# Patient Record
Sex: FEMALE | Race: WHITE | NOT HISPANIC OR LATINO | URBAN - METROPOLITAN AREA
[De-identification: names, ages, dates, MRNs, and addresses within clinical notes are randomized per-mention and may not be internally consistent; named-entity substitution may affect disease eponyms.]

---

## 2017-04-14 ENCOUNTER — OUTPATIENT (OUTPATIENT)
Dept: OUTPATIENT SERVICES | Facility: HOSPITAL | Age: 67
LOS: 1 days | Discharge: HOME | End: 2017-04-14
Payer: MEDICARE

## 2017-04-14 VITALS
TEMPERATURE: 98 F | SYSTOLIC BLOOD PRESSURE: 138 MMHG | DIASTOLIC BLOOD PRESSURE: 76 MMHG | OXYGEN SATURATION: 99 % | WEIGHT: 160.06 LBS | RESPIRATION RATE: 14 BRPM | HEIGHT: 64 IN | HEART RATE: 69 BPM

## 2017-04-14 DIAGNOSIS — M22.42 CHONDROMALACIA PATELLAE, LEFT KNEE: ICD-10-CM

## 2017-04-14 DIAGNOSIS — M23.342 OTHER MENISCUS DERANGEMENTS, ANTERIOR HORN OF LATERAL MENISCUS, LEFT KNEE: ICD-10-CM

## 2017-04-14 DIAGNOSIS — G35 MULTIPLE SCLEROSIS: ICD-10-CM

## 2017-04-14 DIAGNOSIS — Z98.890 OTHER SPECIFIED POSTPROCEDURAL STATES: Chronic | ICD-10-CM

## 2017-04-14 DIAGNOSIS — S83.232D COMPLEX TEAR OF MEDIAL MENISCUS, CURRENT INJURY, LEFT KNEE, SUBSEQUENT ENCOUNTER: ICD-10-CM

## 2017-04-14 DIAGNOSIS — M23.321 OTHER MENISCUS DERANGEMENTS, POSTERIOR HORN OF MEDIAL MENISCUS, RIGHT KNEE: ICD-10-CM

## 2017-04-14 DIAGNOSIS — I10 ESSENTIAL (PRIMARY) HYPERTENSION: ICD-10-CM

## 2017-04-14 DIAGNOSIS — Z01.818 ENCOUNTER FOR OTHER PREPROCEDURAL EXAMINATION: ICD-10-CM

## 2017-04-14 DIAGNOSIS — Z90.49 ACQUIRED ABSENCE OF OTHER SPECIFIED PARTS OF DIGESTIVE TRACT: Chronic | ICD-10-CM

## 2017-04-14 DIAGNOSIS — E03.9 HYPOTHYROIDISM, UNSPECIFIED: ICD-10-CM

## 2017-04-14 DIAGNOSIS — Z90.89 ACQUIRED ABSENCE OF OTHER ORGANS: Chronic | ICD-10-CM

## 2017-04-14 LAB
ANION GAP SERPL CALC-SCNC: 8 MMOL/L — SIGNIFICANT CHANGE UP (ref 5–17)
BASOPHILS # BLD AUTO: 0.1 K/UL — SIGNIFICANT CHANGE UP (ref 0–0.2)
BASOPHILS NFR BLD AUTO: 1.7 % — SIGNIFICANT CHANGE UP (ref 0–2)
BUN SERPL-MCNC: 23 MG/DL — SIGNIFICANT CHANGE UP (ref 7–23)
CALCIUM SERPL-MCNC: 9.4 MG/DL — SIGNIFICANT CHANGE UP (ref 8.5–10.1)
CHLORIDE SERPL-SCNC: 104 MMOL/L — SIGNIFICANT CHANGE UP (ref 96–108)
CO2 SERPL-SCNC: 28 MMOL/L — SIGNIFICANT CHANGE UP (ref 22–31)
CREAT SERPL-MCNC: 0.63 MG/DL — SIGNIFICANT CHANGE UP (ref 0.5–1.3)
EOSINOPHIL # BLD AUTO: 0.5 K/UL — SIGNIFICANT CHANGE UP (ref 0–0.5)
EOSINOPHIL NFR BLD AUTO: 7.2 % — HIGH (ref 0–6)
GLUCOSE SERPL-MCNC: 88 MG/DL — SIGNIFICANT CHANGE UP (ref 70–99)
HCT VFR BLD CALC: 44.6 % — SIGNIFICANT CHANGE UP (ref 34.5–45)
HGB BLD-MCNC: 15.3 G/DL — SIGNIFICANT CHANGE UP (ref 11.5–15.5)
LYMPHOCYTES # BLD AUTO: 1.9 K/UL — SIGNIFICANT CHANGE UP (ref 1–3.3)
LYMPHOCYTES # BLD AUTO: 25.2 % — SIGNIFICANT CHANGE UP (ref 13–44)
MCHC RBC-ENTMCNC: 31.5 PG — SIGNIFICANT CHANGE UP (ref 27–34)
MCHC RBC-ENTMCNC: 34.4 GM/DL — SIGNIFICANT CHANGE UP (ref 32–36)
MCV RBC AUTO: 91.5 FL — SIGNIFICANT CHANGE UP (ref 80–100)
MONOCYTES # BLD AUTO: 0.6 K/UL — SIGNIFICANT CHANGE UP (ref 0–0.9)
MONOCYTES NFR BLD AUTO: 7.7 % — SIGNIFICANT CHANGE UP (ref 2–14)
NEUTROPHILS # BLD AUTO: 4.3 K/UL — SIGNIFICANT CHANGE UP (ref 1.8–7.4)
NEUTROPHILS NFR BLD AUTO: 58.3 % — SIGNIFICANT CHANGE UP (ref 43–77)
PLATELET # BLD AUTO: 226 K/UL — SIGNIFICANT CHANGE UP (ref 150–400)
POTASSIUM SERPL-MCNC: 4.2 MMOL/L — SIGNIFICANT CHANGE UP (ref 3.5–5.3)
POTASSIUM SERPL-SCNC: 4.2 MMOL/L — SIGNIFICANT CHANGE UP (ref 3.5–5.3)
RBC # BLD: 4.87 M/UL — SIGNIFICANT CHANGE UP (ref 3.8–5.2)
RBC # FLD: 12.9 % — SIGNIFICANT CHANGE UP (ref 10.3–14.5)
SODIUM SERPL-SCNC: 140 MMOL/L — SIGNIFICANT CHANGE UP (ref 135–145)
WBC # BLD: 7.4 K/UL — SIGNIFICANT CHANGE UP (ref 3.8–10.5)
WBC # FLD AUTO: 7.4 K/UL — SIGNIFICANT CHANGE UP (ref 3.8–10.5)

## 2017-04-14 PROCEDURE — 93010 ELECTROCARDIOGRAM REPORT: CPT

## 2017-04-14 NOTE — H&P PST ADULT - HISTORY OF PRESENT ILLNESS
65 yo female presents to PST prior to proposed procedure. c/o left knee pain x 6 months after "kicking something under the bed". Reports pain 5/10 that increases at night & with bending the knee. Consulted with Dr Gamboa for xrays & MRI of left knee. Had cortisone injection x 2 with slight relief but then injured knee walking on the treadmill. Pain managed with aleve prn.   Now for said procedure.

## 2017-04-14 NOTE — H&P PST ADULT - FAMILY HISTORY
Father  Still living? No  Family history of myocardial infarction, Age at diagnosis: Age Unknown  Family history of diabetes mellitus, Age at diagnosis: Age Unknown  Family history of TIAs, Age at diagnosis: Age Unknown     Mother  Still living? Yes, Estimated age:   Family history of diabetes mellitus, Age at diagnosis: Age Unknown

## 2017-04-14 NOTE — H&P PST ADULT - PSH
History of esophagogastroduodenoscopy (EGD)    S/P cholecystectomy  & appendectomy 1973  S/P colonoscopy    S/P D&C (status post dilation and curettage)    S/P tonsillectomy

## 2017-04-14 NOTE — H&P PST ADULT - PMH
Depressive disorder    Diverticular disease    Essential hypertension    Gastroesophageal reflux disease without esophagitis    History of kidney stones    History of pneumonia    Hypothyroid    MS (multiple sclerosis)    Pure hypercholesterolemia

## 2017-04-14 NOTE — H&P PST ADULT - ASSESSMENT
67 yo female scheduled for left knee arthroscopy with Dr Gamboa on 4/28/17.    1. Labs as per surgeon  2. EKG  3. Medical clearance with PCP Dr Yeung  4. discussed EZ sponges & day of procedure instructions

## 2017-04-14 NOTE — H&P PST ADULT - PSYCHIATRIC COMMENTS
Reports mild situational depression & is going to discuss medication with PCP @ clearance appointment.

## 2017-04-14 NOTE — H&P PST ADULT - MUSCULOSKELETAL COMMENTS
Left anterior knee tender to palpation. Decreased flexion/extension. Left hamstring & quad strength 4/5. Reports left knee pain & decreased activity level.

## 2017-04-24 RX ORDER — FAMOTIDINE 10 MG/ML
20 INJECTION INTRAVENOUS ONCE
Qty: 0 | Refills: 0 | Status: COMPLETED | OUTPATIENT
Start: 2017-04-25 | End: 2017-04-25

## 2017-04-24 RX ORDER — OXYCODONE HYDROCHLORIDE 5 MG/1
10 TABLET ORAL ONCE
Qty: 0 | Refills: 0 | Status: DISCONTINUED | OUTPATIENT
Start: 2017-04-25 | End: 2017-04-25

## 2017-04-24 RX ORDER — ACETAMINOPHEN 500 MG
975 TABLET ORAL ONCE
Qty: 0 | Refills: 0 | Status: COMPLETED | OUTPATIENT
Start: 2017-04-25 | End: 2017-04-25

## 2017-04-24 RX ORDER — SODIUM CHLORIDE 9 MG/ML
1000 INJECTION, SOLUTION INTRAVENOUS
Qty: 0 | Refills: 0 | Status: DISCONTINUED | OUTPATIENT
Start: 2017-04-25 | End: 2017-04-25

## 2017-04-24 RX ORDER — CELECOXIB 200 MG/1
200 CAPSULE ORAL ONCE
Qty: 0 | Refills: 0 | Status: COMPLETED | OUTPATIENT
Start: 2017-04-25 | End: 2017-04-25

## 2017-04-25 ENCOUNTER — OUTPATIENT (OUTPATIENT)
Dept: OUTPATIENT SERVICES | Facility: HOSPITAL | Age: 67
LOS: 1 days | Discharge: HOME | End: 2017-04-25
Payer: MEDICARE

## 2017-04-25 VITALS
DIASTOLIC BLOOD PRESSURE: 99 MMHG | TEMPERATURE: 98 F | HEIGHT: 64 IN | SYSTOLIC BLOOD PRESSURE: 166 MMHG | OXYGEN SATURATION: 100 % | RESPIRATION RATE: 16 BRPM | HEART RATE: 75 BPM | WEIGHT: 160.06 LBS

## 2017-04-25 VITALS
OXYGEN SATURATION: 97 % | RESPIRATION RATE: 16 BRPM | HEART RATE: 62 BPM | DIASTOLIC BLOOD PRESSURE: 64 MMHG | SYSTOLIC BLOOD PRESSURE: 124 MMHG

## 2017-04-25 DIAGNOSIS — Z98.890 OTHER SPECIFIED POSTPROCEDURAL STATES: Chronic | ICD-10-CM

## 2017-04-25 DIAGNOSIS — Z90.49 ACQUIRED ABSENCE OF OTHER SPECIFIED PARTS OF DIGESTIVE TRACT: Chronic | ICD-10-CM

## 2017-04-25 DIAGNOSIS — Z90.89 ACQUIRED ABSENCE OF OTHER ORGANS: Chronic | ICD-10-CM

## 2017-04-25 PROCEDURE — 88304 TISSUE EXAM BY PATHOLOGIST: CPT | Mod: 26

## 2017-04-25 RX ORDER — ASPIRIN/CALCIUM CARB/MAGNESIUM 324 MG
1 TABLET ORAL
Qty: 14 | Refills: 0
Start: 2017-04-25 | End: 2017-05-09

## 2017-04-25 RX ORDER — FENTANYL CITRATE 50 UG/ML
50 INJECTION INTRAVENOUS
Qty: 0 | Refills: 0 | Status: DISCONTINUED | OUTPATIENT
Start: 2017-04-25 | End: 2017-04-25

## 2017-04-25 RX ORDER — OXYCODONE HYDROCHLORIDE 5 MG/1
5 TABLET ORAL EVERY 4 HOURS
Qty: 0 | Refills: 0 | Status: DISCONTINUED | OUTPATIENT
Start: 2017-04-25 | End: 2017-04-25

## 2017-04-25 RX ORDER — OXYCODONE HYDROCHLORIDE 5 MG/1
1 TABLET ORAL
Qty: 40 | Refills: 0
Start: 2017-04-25

## 2017-04-25 RX ORDER — ONDANSETRON 8 MG/1
4 TABLET, FILM COATED ORAL EVERY 6 HOURS
Qty: 0 | Refills: 0 | Status: DISCONTINUED | OUTPATIENT
Start: 2017-04-25 | End: 2017-04-25

## 2017-04-25 RX ORDER — SODIUM CHLORIDE 9 MG/ML
1000 INJECTION, SOLUTION INTRAVENOUS
Qty: 0 | Refills: 0 | Status: DISCONTINUED | OUTPATIENT
Start: 2017-04-25 | End: 2017-05-10

## 2017-04-25 RX ADMIN — SODIUM CHLORIDE 75 MILLILITER(S): 9 INJECTION, SOLUTION INTRAVENOUS at 13:37

## 2017-04-25 RX ADMIN — FAMOTIDINE 20 MILLIGRAM(S): 10 INJECTION INTRAVENOUS at 11:20

## 2017-04-25 RX ADMIN — CELECOXIB 200 MILLIGRAM(S): 200 CAPSULE ORAL at 11:20

## 2017-04-25 RX ADMIN — OXYCODONE HYDROCHLORIDE 10 MILLIGRAM(S): 5 TABLET ORAL at 11:21

## 2017-04-25 RX ADMIN — Medication 975 MILLIGRAM(S): at 11:21

## 2017-04-25 NOTE — BRIEF OPERATIVE NOTE - PRE-OP DX
Medial meniscus tear  04/25/2017    Active  Herman Howe Lateral meniscus tear  04/25/2017    Active  Herman Howe  Medial meniscus tear  04/25/2017    Active  Herman Howe

## 2017-04-25 NOTE — ASU DISCHARGE PLAN (ADULT/PEDIATRIC). - MEDICATION SUMMARY - MEDICATIONS TO TAKE
I will START or STAY ON the medications listed below when I get home from the hospital:    aspirin 81 mg oral delayed release tablet  -- 1 tab(s) by mouth once a day  -- Indication: For home med    Aleve 220 mg oral capsule  -- 1 cap(s) by mouth every 12 hours, As Needed  -- Indication: For home med    acetaminophen-oxyCODONE 325 mg-5 mg oral tablet  -- 1 tab(s) by mouth 4 times a day, As Needed -for severe pain MDD:6  -- Caution federal law prohibits the transfer of this drug to any person other  than the person for whom it was prescribed.  May cause drowsiness.  Alcohol may intensify this effect.  Use care when operating dangerous machinery.  This prescription cannot be refilled.  This product contains acetaminophen.  Do not use  with any other product containing acetaminophen to prevent possible liver damage.  Using more of this medication than prescribed may cause serious breathing problems.    -- Indication: For pain med    Aspirin Enteric Coated 325 mg oral delayed release tablet  -- 1 tab(s) by mouth once a day  -- Swallow whole.  Do not crush.  Take with food or milk.    -- Indication: For dvt ppx    lisinopril 10 mg oral tablet  -- 1 tab(s) by mouth 2 times a day  -- Indication: For home med    simvastatin 20 mg oral tablet  -- 1 tab(s) by mouth once a day (at bedtime)  -- Indication: For home med    triamterene-hydroCHLOROthiazide 37.5mg-25mg oral tablet  -- 1 tab(s) by mouth once a day  -- Indication: For home med    melatonin 5 mg oral tablet  -- 1 tab(s) by mouth once a day (at bedtime)  -- Indication: For home med    omeprazole 20 mg oral delayed release tablet  -- 1 tab(s) by mouth once a day  -- Indication: For home med    levothyroxine 50 mcg (0.05 mg) oral tablet  -- 1 tab(s) by mouth once a day  -- Indication: For home med    Vitamin D3 1000 intl units oral capsule  -- 1 cap(s) by mouth once a day  -- Indication: For home med

## 2017-04-25 NOTE — BRIEF OPERATIVE NOTE - PROCEDURE
Partial medial meniscectomy of left knee  04/25/2017    Active  DAMEON Partial lateral meniscectomy of left knee  04/25/2017    Active  DAMEON

## 2017-04-27 LAB — SURGICAL PATHOLOGY FINAL REPORT - CH: SIGNIFICANT CHANGE UP

## 2017-05-03 DIAGNOSIS — I10 ESSENTIAL (PRIMARY) HYPERTENSION: ICD-10-CM

## 2017-05-03 DIAGNOSIS — M23.321 OTHER MENISCUS DERANGEMENTS, POSTERIOR HORN OF MEDIAL MENISCUS, RIGHT KNEE: ICD-10-CM

## 2017-05-03 DIAGNOSIS — M23.342 OTHER MENISCUS DERANGEMENTS, ANTERIOR HORN OF LATERAL MENISCUS, LEFT KNEE: ICD-10-CM

## 2017-05-03 DIAGNOSIS — M22.42 CHONDROMALACIA PATELLAE, LEFT KNEE: ICD-10-CM

## 2017-05-03 DIAGNOSIS — G35 MULTIPLE SCLEROSIS: ICD-10-CM

## 2017-05-03 DIAGNOSIS — E03.9 HYPOTHYROIDISM, UNSPECIFIED: ICD-10-CM

## 2018-04-21 NOTE — H&P PST ADULT - VASCULAR
Gyn Progress note   Yue Galarza 42 y o  female MRN: [id number]  Unit/Bed#: PWQO 024-32 Encounter: [id number]    Yue Galarza has no current complaints  Pain is present - adequately treated  Patient is currently voiding  She is not ambulating  Patient is not currently passing flatus and has had no bowel movement  She is tolerating PO, and denies nausea or vomitting  Patient denies fever, chills, chest pain, shortness of breath, or calf tenderness  /72 (BP Location: Left arm)   Pulse 77   Temp 98 9 °F (37 2 °C) (Oral)   Resp 20   Ht 5' 6" (1 676 m)   Wt 85 7 kg (189 lb)   SpO2 100%   BMI 30 51 kg/m²     I/O last 3 completed shifts: In: 1800 [I V :1800]  Out: 500 [Urine:100; Blood:400]  I/O this shift:  In: 1680 [P O :480; I V :1200]  Out: 200 [Urine:200]    Lab Results   Component Value Date    WBC 14 86 (H) 04/21/2018    HGB 11 2 (L) 04/21/2018    HCT 34 5 (L) 04/21/2018    MCV 85 04/21/2018     04/21/2018       Lab Results   Component Value Date    GLUCOSE 123 04/21/2018    CALCIUM 8 0 (L) 04/21/2018     (L) 04/21/2018    K 4 0 04/21/2018    CO2 25 04/21/2018     04/21/2018    BUN 11 04/21/2018    CREATININE 0 64 04/21/2018       Magnesium   Date Value Ref Range Status   04/06/2015 1 9 1 6 - 2 6 mg/dL Final     Comment:     The above 1 analytes were performed by Martín Velázquez [id number]         Lab Results   Component Value Date/Time    POCGLU 128 04/20/2018 06:56 PM       Physical Exam  Gen: AAOx3, NAD, comfortable in bed   CVS:  RRR, no murmurs or gallops  Lungs: CTA B/L, no rales or rhonchi,  normal respiratory effort and rate  Abdomen: soft, non tender, incisions C/D/I  Extremities: SCDs on and on, non tender, no edema, negative Homans  A/P: Yue Galarza s/p total vaginal hysterectomy for chronic pelvic pain POD# 1  1)Surgery:   Follow-up final pathology, hemoglobin with adequate dropped from 12 1-11 2  2) hypothyroidism:  Continue home Synthroid  3) Encouraged incentive spirometry to reduce atelectasis and pneumonia risk  4) Pain:  Well-controlled on current regimen continue  5) FEN: will advance to Regular diet, will DC IV fluids  6) PPX:  SCDs    Dispo: anticipate DC home today Equal and normal pulses (carotid, femoral, dorsalis pedis)

## 2019-03-05 PROBLEM — K21.9 GASTRO-ESOPHAGEAL REFLUX DISEASE WITHOUT ESOPHAGITIS: Chronic | Status: ACTIVE | Noted: 2017-04-14

## 2019-03-05 PROBLEM — F32.9 MAJOR DEPRESSIVE DISORDER, SINGLE EPISODE, UNSPECIFIED: Chronic | Status: ACTIVE | Noted: 2017-04-14

## 2019-03-05 PROBLEM — Z87.01 PERSONAL HISTORY OF PNEUMONIA (RECURRENT): Chronic | Status: ACTIVE | Noted: 2017-04-14

## 2019-03-05 PROBLEM — Z87.442 PERSONAL HISTORY OF URINARY CALCULI: Chronic | Status: ACTIVE | Noted: 2017-04-14

## 2019-03-05 PROBLEM — I10 ESSENTIAL (PRIMARY) HYPERTENSION: Chronic | Status: ACTIVE | Noted: 2017-04-14

## 2019-03-05 PROBLEM — E03.9 HYPOTHYROIDISM, UNSPECIFIED: Chronic | Status: ACTIVE | Noted: 2017-04-14

## 2019-03-05 PROBLEM — K57.90 DIVERTICULOSIS OF INTESTINE, PART UNSPECIFIED, WITHOUT PERFORATION OR ABSCESS WITHOUT BLEEDING: Chronic | Status: ACTIVE | Noted: 2017-04-14

## 2019-03-05 PROBLEM — G35 MULTIPLE SCLEROSIS: Chronic | Status: ACTIVE | Noted: 2017-04-14

## 2019-03-05 PROBLEM — E78.00 PURE HYPERCHOLESTEROLEMIA, UNSPECIFIED: Chronic | Status: ACTIVE | Noted: 2017-04-14

## 2019-03-07 ENCOUNTER — APPOINTMENT (OUTPATIENT)
Dept: FAMILY MEDICINE | Facility: CLINIC | Age: 69
End: 2019-03-07
Payer: MEDICARE

## 2019-03-07 VITALS
HEIGHT: 66 IN | HEART RATE: 79 BPM | BODY MASS INDEX: 25.88 KG/M2 | WEIGHT: 161 LBS | OXYGEN SATURATION: 98 % | TEMPERATURE: 98.9 F | SYSTOLIC BLOOD PRESSURE: 140 MMHG | DIASTOLIC BLOOD PRESSURE: 90 MMHG | RESPIRATION RATE: 15 BRPM

## 2019-03-07 DIAGNOSIS — J45.909 UNSPECIFIED ASTHMA, UNCOMPLICATED: ICD-10-CM

## 2019-03-07 DIAGNOSIS — Z83.3 FAMILY HISTORY OF DIABETES MELLITUS: ICD-10-CM

## 2019-03-07 DIAGNOSIS — Z81.8 FAMILY HISTORY OF OTHER MENTAL AND BEHAVIORAL DISORDERS: ICD-10-CM

## 2019-03-07 DIAGNOSIS — Z82.49 FAMILY HISTORY OF ISCHEMIC HEART DISEASE AND OTHER DISEASES OF THE CIRCULATORY SYSTEM: ICD-10-CM

## 2019-03-07 DIAGNOSIS — Z86.69 PERSONAL HISTORY OF OTHER DISEASES OF THE NERVOUS SYSTEM AND SENSE ORGANS: ICD-10-CM

## 2019-03-07 DIAGNOSIS — Z82.1 FAMILY HISTORY OF BLINDNESS AND VISUAL LOSS: ICD-10-CM

## 2019-03-07 DIAGNOSIS — Z87.01 PERSONAL HISTORY OF PNEUMONIA (RECURRENT): ICD-10-CM

## 2019-03-07 LAB — CYTOLOGY CVX/VAG DOC THIN PREP: NORMAL

## 2019-03-07 PROCEDURE — 90472 IMMUNIZATION ADMIN EACH ADD: CPT | Mod: GY

## 2019-03-07 PROCEDURE — 99204 OFFICE O/P NEW MOD 45 MIN: CPT | Mod: 25

## 2019-03-07 PROCEDURE — 90715 TDAP VACCINE 7 YRS/> IM: CPT | Mod: GY

## 2019-03-07 PROCEDURE — 90746 HEPB VACCINE 3 DOSE ADULT IM: CPT

## 2019-03-07 PROCEDURE — G0444 DEPRESSION SCREEN ANNUAL: CPT | Mod: 59

## 2019-03-07 PROCEDURE — G0010: CPT

## 2019-03-07 RX ORDER — CHROMIUM 200 MCG
TABLET ORAL
Refills: 0 | Status: ACTIVE | COMMUNITY

## 2019-03-07 RX ORDER — VIT C/E/CUPERIC/ZINC/LUTEIN 226-90-0.8
CAPSULE ORAL
Refills: 0 | Status: ACTIVE | COMMUNITY

## 2019-03-07 NOTE — HEALTH RISK ASSESSMENT
[Good] : ~his/her~  mood as  good [1] : 2) Feeling down, depressed, or hopeless for several days (1) [None] : None [Alone] : lives alone [Single] : single [Significant Other] : lives with significant other [Sexually Active] : sexually active [Feels Safe at Home] : Feels safe at home [Fully functional (bathing, dressing, toileting, transferring, walking, feeding)] : Fully functional (bathing, dressing, toileting, transferring, walking, feeding) [Fully functional (using the telephone, shopping, preparing meals, housekeeping, doing laundry, using] : Fully functional and needs no help or supervision to perform IADLs (using the telephone, shopping, preparing meals, housekeeping, doing laundry, using transportation, managing medications and managing finances) [Smoke Detector] : smoke detector [Carbon Monoxide Detector] : carbon monoxide detector [Seat Belt] :  uses seat belt [Sunscreen] : uses sunscreen [With Patient/Caregiver] : With Patient/Caregiver [Name: ___] : Health Care Proxy's Name: [unfilled]  [Relationship: ___] : Relationship: [unfilled] [No falls in past year] : Patient reported no falls in the past year [Patient reported mammogram was normal] : Patient reported mammogram was normal [Patient reported PAP Smear was normal] : Patient reported PAP Smear was normal [Patient reported bone density results were normal] : Patient reported bone density results were normal [Patient reported colonoscopy was normal] : Patient reported colonoscopy was normal [] : No [[id number]] : 2 [Change in mental status noted] : No change in mental status noted [Language] : denies difficulty with language [Behavior] : denies difficulty with behavior [Reasoning] : denies difficulty with reasoning [High Risk Behavior] : no high risk behavior [Reports changes in hearing] : Reports no changes in hearing [Reports changes in vision] : Reports no changes in vision [Reports changes in dental health] : Reports no changes in dental health [MammogramDate] : 12/18 [PapSmearDate] : 12/18 [BoneDensityDate] : 12/18 [ColonoscopyDate] : 1/16 [FreeTextEntry2] : planning to volunteer  [AdvancecareDate] : 03/19

## 2019-03-07 NOTE — PHYSICAL EXAM
[No Acute Distress] : no acute distress [Well Nourished] : well nourished [Well Developed] : well developed [Well-Appearing] : well-appearing [Normal Sclera/Conjunctiva] : normal sclera/conjunctiva [PERRL] : pupils equal round and reactive to light [EOMI] : extraocular movements intact [Normal Outer Ear/Nose] : the outer ears and nose were normal in appearance [Normal Oropharynx] : the oropharynx was normal [Normal TMs] : both tympanic membranes were normal [Supple] : supple [No Lymphadenopathy] : no lymphadenopathy [No Respiratory Distress] : no respiratory distress  [Clear to Auscultation] : lungs were clear to auscultation bilaterally [No Accessory Muscle Use] : no accessory muscle use [Normal Rate] : normal rate  [Regular Rhythm] : with a regular rhythm [Normal S1, S2] : normal S1 and S2 [Pedal Pulses Present] : the pedal pulses are present [No Edema] : there was no peripheral edema [Soft] : abdomen soft [Non Tender] : non-tender [Non-distended] : non-distended [No Masses] : no abdominal mass palpated [No HSM] : no HSM [Normal Bowel Sounds] : normal bowel sounds [Normal Posterior Cervical Nodes] : no posterior cervical lymphadenopathy [Normal Anterior Cervical Nodes] : no anterior cervical lymphadenopathy [No Spinal Tenderness] : no spinal tenderness [Grossly Normal Strength/Tone] : grossly normal strength/tone [No Rash] : no rash [Normal Gait] : normal gait [No Focal Deficits] : no focal deficits [Normal Affect] : the affect was normal [Alert and Oriented x3] : oriented to person, place, and time [Normal Insight/Judgement] : insight and judgment were intact

## 2019-03-07 NOTE — ASSESSMENT
[FreeTextEntry1] : Establish care\par - going for blood work in 2 weeks \par - will get records from cardio\par - up to date with flu vaccine\par - tdap given today\par - hep b series started today - will follow up in 2 months for 2nd shot\par - believes she had PNA vaccine - will bring record to next visit\par - up to date with pap, mammo, DEXA, and colonoscopy \par \par Hypertension\par - BP well controlled\par - following with cardiology, next appt in 2 weeks\par - continue lisinopril and dyazide\par \par Hyperlipidemia\par - on statin\par - will have repeat lipid panel at next blood draw\par \par Hypothyroid\par - on levothyroxine\par - going for blood work in 2 weeks

## 2019-03-07 NOTE — COUNSELING
[Healthy eating counseling provided] : healthy eating [Activity counseling provided] : activity [Good understanding] : Patient has a good understanding of disease, goals and obesity follow-up plan [ - Annual Depression Screening] : Annual Depression Screening

## 2019-03-07 NOTE — HISTORY OF PRESENT ILLNESS
[FreeTextEntry1] : establish care\par needs vaccines [de-identified] : Patient is here today to establish care.  She has not had a primary care doctor in years.  She has been routinely getting check ups at her cardiologist office.  She has follow up with the cardiologist in 2 weeks.  She is doing well, without any major complaints. 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  She has been getting paperwork filled out.  She had titer blood tests which showed immunity to all except HepB.  She would like to start the vaccine series.

## 2019-03-18 ENCOUNTER — APPOINTMENT (OUTPATIENT)
Dept: FAMILY MEDICINE | Facility: CLINIC | Age: 69
End: 2019-03-18
Payer: MEDICARE

## 2019-03-18 VITALS
HEIGHT: 66 IN | HEART RATE: 69 BPM | RESPIRATION RATE: 16 BRPM | DIASTOLIC BLOOD PRESSURE: 64 MMHG | BODY MASS INDEX: 25.88 KG/M2 | OXYGEN SATURATION: 98 % | TEMPERATURE: 97.9 F | WEIGHT: 161 LBS | SYSTOLIC BLOOD PRESSURE: 122 MMHG

## 2019-03-18 DIAGNOSIS — Z11.1 ENCOUNTER FOR SCREENING FOR RESPIRATORY TUBERCULOSIS: ICD-10-CM

## 2019-03-18 PROCEDURE — 86580 TB INTRADERMAL TEST: CPT

## 2019-03-18 PROCEDURE — 99211 OFF/OP EST MAY X REQ PHY/QHP: CPT | Mod: 25

## 2019-04-29 ENCOUNTER — APPOINTMENT (OUTPATIENT)
Dept: FAMILY MEDICINE | Facility: CLINIC | Age: 69
End: 2019-04-29
Payer: MEDICARE

## 2019-04-29 VITALS
WEIGHT: 166 LBS | HEIGHT: 66 IN | TEMPERATURE: 97.9 F | SYSTOLIC BLOOD PRESSURE: 140 MMHG | DIASTOLIC BLOOD PRESSURE: 76 MMHG | OXYGEN SATURATION: 97 % | BODY MASS INDEX: 26.68 KG/M2 | HEART RATE: 75 BPM | RESPIRATION RATE: 16 BRPM

## 2019-04-29 PROCEDURE — 99213 OFFICE O/P EST LOW 20 MIN: CPT | Mod: 25

## 2019-04-29 PROCEDURE — G0010: CPT

## 2019-04-29 PROCEDURE — 90746 HEPB VACCINE 3 DOSE ADULT IM: CPT

## 2019-04-29 NOTE — HISTORY OF PRESENT ILLNESS
[FreeTextEntry1] : follow up [de-identified] : Patient is here today for follow up.\par She is treated for HTN, HLD, and hypothyroid.\par She has been doing well on her medications.  She had blood work done at her cardiologist and reports it was normal. \par She needs her second Hep B injection today.

## 2019-04-29 NOTE — ASSESSMENT
[FreeTextEntry1] : Hypothyroid\par - doing well on levothyroxine\par - will get copy of labs from cardio\par \par Hyperlipidemia\par - on statin\par - get copy of labs from cardio \par - patient reports normal labs in March\par \par Hep B\par - second injection given today\par - tolerated well\par - follow up in September for last injection

## 2019-04-29 NOTE — PHYSICAL EXAM
[No Acute Distress] : no acute distress [Well Nourished] : well nourished [Well Developed] : well developed [Normal Sclera/Conjunctiva] : normal sclera/conjunctiva [PERRL] : pupils equal round and reactive to light [Normal Outer Ear/Nose] : the outer ears and nose were normal in appearance [Normal Oropharynx] : the oropharynx was normal [Supple] : supple [No Respiratory Distress] : no respiratory distress  [Clear to Auscultation] : lungs were clear to auscultation bilaterally [No Accessory Muscle Use] : no accessory muscle use [Normal Rate] : normal rate  [Regular Rhythm] : with a regular rhythm [Normal S1, S2] : normal S1 and S2 [No Rash] : no rash [Normal Gait] : normal gait [Normal Affect] : the affect was normal [Normal Insight/Judgement] : insight and judgment were intact

## 2019-07-29 DIAGNOSIS — M25.561 PAIN IN RIGHT KNEE: ICD-10-CM

## 2019-09-20 ENCOUNTER — CHART COPY (OUTPATIENT)
Age: 69
End: 2019-09-20

## 2019-10-02 ENCOUNTER — APPOINTMENT (OUTPATIENT)
Dept: FAMILY MEDICINE | Facility: CLINIC | Age: 69
End: 2019-10-02

## 2019-12-12 ENCOUNTER — RX RENEWAL (OUTPATIENT)
Age: 69
End: 2019-12-12

## 2020-01-06 ENCOUNTER — APPOINTMENT (OUTPATIENT)
Dept: FAMILY MEDICINE | Facility: CLINIC | Age: 70
End: 2020-01-06
Payer: MEDICARE

## 2020-01-06 VITALS
RESPIRATION RATE: 16 BRPM | HEIGHT: 66 IN | HEART RATE: 76 BPM | SYSTOLIC BLOOD PRESSURE: 124 MMHG | OXYGEN SATURATION: 97 % | BODY MASS INDEX: 26.2 KG/M2 | WEIGHT: 163 LBS | DIASTOLIC BLOOD PRESSURE: 80 MMHG

## 2020-01-06 DIAGNOSIS — Z23 ENCOUNTER FOR IMMUNIZATION: ICD-10-CM

## 2020-01-06 DIAGNOSIS — Z76.89 PERSONS ENCOUNTERING HEALTH SERVICES IN OTHER SPECIFIED CIRCUMSTANCES: ICD-10-CM

## 2020-01-06 DIAGNOSIS — Z92.29 PERSONAL HISTORY OF OTHER DRUG THERAPY: ICD-10-CM

## 2020-01-06 LAB — CYTOLOGY CVX/VAG DOC THIN PREP: NEGATIVE

## 2020-01-06 PROCEDURE — 99214 OFFICE O/P EST MOD 30 MIN: CPT | Mod: 25

## 2020-01-06 PROCEDURE — 90732 PPSV23 VACC 2 YRS+ SUBQ/IM: CPT

## 2020-01-06 PROCEDURE — G0009: CPT

## 2020-01-06 RX ORDER — SIMVASTATIN 20 MG/1
20 TABLET, FILM COATED ORAL
Refills: 0 | Status: COMPLETED | COMMUNITY
End: 2020-01-06

## 2020-01-06 RX ORDER — LISINOPRIL 10 MG/1
10 TABLET ORAL
Qty: 180 | Refills: 1 | Status: ACTIVE | COMMUNITY
Start: 1900-01-01 | End: 1900-01-01

## 2020-01-06 RX ORDER — ASPIRIN 81 MG
81 TABLET, DELAYED RELEASE (ENTERIC COATED) ORAL
Refills: 0 | Status: COMPLETED | COMMUNITY
End: 2020-01-06

## 2020-01-06 RX ORDER — TRIAMTERENE AND HYDROCHLOROTHIAZIDE 37.5; 25 MG/1; MG/1
37.5-25 CAPSULE ORAL
Refills: 0 | Status: COMPLETED | COMMUNITY
End: 2020-01-06

## 2020-01-06 RX ORDER — TRIAMTERENE AND HYDROCHLOROTHIAZIDE 25; 37.5 MG/1; MG/1
37.5-25 TABLET ORAL DAILY
Qty: 90 | Refills: 1 | Status: ACTIVE | COMMUNITY
Start: 2020-01-06 | End: 1900-01-01

## 2020-01-06 NOTE — PHYSICAL EXAM
[No Acute Distress] : no acute distress [Well Nourished] : well nourished [Normal Sclera/Conjunctiva] : normal sclera/conjunctiva [Well Developed] : well developed [Normal Outer Ear/Nose] : the outer ears and nose were normal in appearance [PERRL] : pupils equal round and reactive to light [Normal TMs] : both tympanic membranes were normal [Normal Oropharynx] : the oropharynx was normal [Supple] : supple [No Lymphadenopathy] : no lymphadenopathy [No Accessory Muscle Use] : no accessory muscle use [No Respiratory Distress] : no respiratory distress  [Normal Rate] : normal rate  [Clear to Auscultation] : lungs were clear to auscultation bilaterally [Regular Rhythm] : with a regular rhythm [Normal S1, S2] : normal S1 and S2 [Non Tender] : non-tender [Soft] : abdomen soft [Non-distended] : non-distended [Normal Bowel Sounds] : normal bowel sounds [Normal Anterior Cervical Nodes] : no anterior cervical lymphadenopathy [No Spinal Tenderness] : no spinal tenderness [Grossly Normal Strength/Tone] : grossly normal strength/tone [No Rash] : no rash [No Focal Deficits] : no focal deficits [Normal Affect] : the affect was normal [Normal Gait] : normal gait [Normal Insight/Judgement] : insight and judgment were intact

## 2020-01-06 NOTE — ASSESSMENT
[FreeTextEntry1] : Hypertension\par - BP well controlled \par - refill meds today\par - follow up CPE in June\par \par Hyperlipidemia\par - off statin \par - follow up labs in June \par \par Hypothyroid\par - doing well on levothyroxine \par - refill today\par \par GERD\par - renew famotidine \par - if not improving trial on omeprazole\par \par Pneumococcal Vaccine\par - pneumovax given today

## 2020-01-06 NOTE — HISTORY OF PRESENT ILLNESS
[de-identified] : Patient is here today for follow up.\par She has been doing well, feels well.\par She needs renewals on medications.\par Blood pressure has been stable.  She is taking lisinopril and triamterene/hctz.  No complaints\par Her cardiologist stopped her statin as her cholesterol was low.  She will be due for blood work at her next visit.  Blood work reviewed from 9/2019.\par She is also up to date with all screenings. \par Her cardiologist retired and she brought old records for the chart. \par  [FreeTextEntry1] : follow up

## 2020-03-25 ENCOUNTER — RX RENEWAL (OUTPATIENT)
Age: 70
End: 2020-03-25

## 2020-03-25 RX ORDER — FAMOTIDINE 40 MG/1
40 TABLET, FILM COATED ORAL
Qty: 90 | Refills: 0 | Status: ACTIVE | COMMUNITY
Start: 2020-03-25 | End: 1900-01-01

## 2020-06-18 ENCOUNTER — RX RENEWAL (OUTPATIENT)
Age: 70
End: 2020-06-18

## 2021-10-25 ENCOUNTER — APPOINTMENT (OUTPATIENT)
Dept: ORTHOPEDIC SURGERY | Facility: CLINIC | Age: 71
End: 2021-10-25
Payer: MEDICARE

## 2021-10-25 VITALS
DIASTOLIC BLOOD PRESSURE: 90 MMHG | BODY MASS INDEX: 24.71 KG/M2 | SYSTOLIC BLOOD PRESSURE: 173 MMHG | HEIGHT: 68 IN | WEIGHT: 163 LBS | HEART RATE: 82 BPM

## 2021-10-25 DIAGNOSIS — M17.11 UNILATERAL PRIMARY OSTEOARTHRITIS, RIGHT KNEE: ICD-10-CM

## 2021-10-25 DIAGNOSIS — S83.249A OTHER TEAR OF MEDIAL MENISCUS, CURRENT INJURY, UNSPECIFIED KNEE, INITIAL ENCOUNTER: ICD-10-CM

## 2021-10-25 PROCEDURE — 99204 OFFICE O/P NEW MOD 45 MIN: CPT

## 2021-10-26 DIAGNOSIS — Z87.39 PERSONAL HISTORY OF OTHER DISEASES OF THE MUSCULOSKELETAL SYSTEM AND CONNECTIVE TISSUE: ICD-10-CM

## 2021-10-26 DIAGNOSIS — S83.241A OTHER TEAR OF MEDIAL MENISCUS, CURRENT INJURY, RIGHT KNEE, INITIAL ENCOUNTER: ICD-10-CM

## 2021-10-26 DIAGNOSIS — Z86.79 PERSONAL HISTORY OF OTHER DISEASES OF THE CIRCULATORY SYSTEM: ICD-10-CM

## 2021-10-26 PROBLEM — M17.11 PRIMARY OSTEOARTHRITIS OF RIGHT KNEE: Status: ACTIVE | Noted: 2021-10-26

## 2021-10-26 PROBLEM — S83.249A ACUTE MEDIAL MENISCAL TEAR: Status: ACTIVE | Noted: 2021-10-26

## 2021-10-26 NOTE — REVIEW OF SYSTEMS
[Joint Stiffness] : joint stiffness [Joint Swelling] : joint swelling [Heartburn] : heartburn [Sleep Disturbances] : ~T sleep disturbances [Negative] : Heme/Lymph

## 2021-10-27 NOTE — ADDENDUM
[FreeTextEntry1] : This note was written by Madyson Berger on 10/26/2021 acting as a scribe for JANELL MEDINA III, MD

## 2021-10-27 NOTE — DISCUSSION/SUMMARY
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  All the risks and benefits of the surgery, including, but not limited to, anesthesia, infection, nerve and/or vascular injury and need for further surgery, were all discussed with the patient at length.  In light of these, patient wishes to proceed.  Patient will be scheduled at this time.

## 2021-10-27 NOTE — PHYSICAL EXAM
[de-identified] : Left Knee: \par Range of Motion in Degrees	\par 	                  Claimant:	Normal:	\par Flexion Active	  135 	                135-degrees	\par Flexion Passive	  135	                135-degrees	\par Extension Active	  0-5	                0-5-degrees	\par Extension Passive	  0-5	                0-5-degrees	\par \par No weakness to flexion/extension.  No evidence of instability in the AP plane or varus or valgus stress.  Negative  Lachman.  Negative pivot shift.  Negative anterior drawer test.  Negative posterior drawer test.  Negative Vicenta.  Negative Apley grind.  No medial or lateral joint line tenderness.  No tenderness over the medial and lateral facet of the patella.  No patellofemoral crepitations.  No lateral tilting patella.  No patellar apprehension.  No crepitation in the medial and lateral femoral condyle.  No proximal or distal swelling, edema or tenderness.  No gross motor or sensory deficits.  No intra-articular swelling.  2+ DP and PT pulses. No varus or valgus malalignment.    \par \par Right Knee:\par Range of Motion in Degrees	\par 	                  Claimant:	Normal:	\par Flexion Active	  135 	               135-degrees	\par Flexion Passive	  135	               135-degrees	\par Extension Active	  0-5	               0-5-degrees	\par Extension Passive     0-5	               0-5-degrees	\par \par No weakness to flexion/extension.  No evidence of instability in the AP plane or varus or valgus stress.  Negative  Lachman.  Negative pivot shift.  Negative anterior drawer test.  Negative posterior drawer test.  Positive Vicenta.  Positive Apley grind.  Positive medial joint line tenderness.  Negative lateral joint line tenderness.  Positive tenderness over the medial and lateral facet of the patella.  Positive patellofemoral crepitations.  No lateral tilting patella.  No patellar apprehension.  Positive crepitation in the medial and lateral femoral condyle.  No proximal or distal swelling, edema or tenderness.  No gross motor or sensory deficits.  Mild intra-articular swelling.  2+ DP and PT pulses.  No varus or valgus malalignment.  Skin is intact.  No rashes, scars or lesions. \par   [de-identified] : Ambulating with a slightly antalgic to antalgic gait.  Station:  Normal.  [de-identified] : Appearance:  Well-developed, well-nourished female in no acute distress.\par   [de-identified] : Review of the MRI shows a tear in the posterior horn mid body medial meniscus, with what appears to be mild to moderate degenerative changes.

## 2021-10-27 NOTE — HISTORY OF PRESENT ILLNESS
[(no relief)  0] : the relief from treatment is 0/10 (no relief) [2] : the ailment interference is 2/10 [(Does not interfere) 0] : the ailment interference is 0/10 (does not interfere) [de-identified] : The patient comes in today for her right knee.  She has a history of having left knee arthroscopic intervention.  She had an MRI.  She has been under care and booked her surgery elsewhere, but she wants to transfer her care here (she was unclear as to the reasons why). The patient states the onset/injury occurred in July of 2021.  This injury is not work related or due to an automobile accident.  The patient states the pain is constant.  The patient describes the pain as stabbing and burning.  The patient notes rest and medication makes her symptoms better, while bending and laying down makes her symptoms worse.  The patient indicates a pain level of 10 on a pain scale of 0-10.  [] : No [de-identified] : Physical therapy [de-identified] : Advil

## 2021-10-28 LAB
APTT BLD: 27.8 SEC
INR PPP: 0.95 RATIO
PT BLD: 11.3 SEC

## 2021-10-30 ENCOUNTER — APPOINTMENT (OUTPATIENT)
Dept: DISASTER EMERGENCY | Facility: CLINIC | Age: 71
End: 2021-10-30

## 2021-10-30 DIAGNOSIS — Z01.818 ENCOUNTER FOR OTHER PREPROCEDURAL EXAMINATION: ICD-10-CM

## 2021-10-31 LAB — SARS-COV-2 N GENE NPH QL NAA+PROBE: NOT DETECTED

## 2021-11-02 ENCOUNTER — APPOINTMENT (OUTPATIENT)
Dept: ORTHOPEDIC SURGERY | Facility: HOSPITAL | Age: 71
End: 2021-11-02
Payer: MEDICARE

## 2021-11-02 ENCOUNTER — RESULT REVIEW (OUTPATIENT)
Age: 71
End: 2021-11-02

## 2021-11-02 ENCOUNTER — OUTPATIENT (OUTPATIENT)
Dept: INPATIENT UNIT | Facility: HOSPITAL | Age: 71
LOS: 1 days | Discharge: HOME | End: 2021-11-02
Payer: MEDICARE

## 2021-11-02 VITALS
SYSTOLIC BLOOD PRESSURE: 143 MMHG | RESPIRATION RATE: 16 BRPM | TEMPERATURE: 98 F | DIASTOLIC BLOOD PRESSURE: 70 MMHG | HEIGHT: 66 IN | OXYGEN SATURATION: 100 % | WEIGHT: 171.96 LBS | HEART RATE: 76 BPM

## 2021-11-02 VITALS
DIASTOLIC BLOOD PRESSURE: 70 MMHG | TEMPERATURE: 98 F | OXYGEN SATURATION: 99 % | SYSTOLIC BLOOD PRESSURE: 138 MMHG | RESPIRATION RATE: 16 BRPM | HEART RATE: 80 BPM

## 2021-11-02 DIAGNOSIS — M17.11 UNILATERAL PRIMARY OSTEOARTHRITIS, RIGHT KNEE: ICD-10-CM

## 2021-11-02 DIAGNOSIS — S83.241A OTHER TEAR OF MEDIAL MENISCUS, CURRENT INJURY, RIGHT KNEE, INITIAL ENCOUNTER: ICD-10-CM

## 2021-11-02 DIAGNOSIS — Z90.89 ACQUIRED ABSENCE OF OTHER ORGANS: Chronic | ICD-10-CM

## 2021-11-02 DIAGNOSIS — Z98.890 OTHER SPECIFIED POSTPROCEDURAL STATES: Chronic | ICD-10-CM

## 2021-11-02 DIAGNOSIS — Z90.49 ACQUIRED ABSENCE OF OTHER SPECIFIED PARTS OF DIGESTIVE TRACT: Chronic | ICD-10-CM

## 2021-11-02 PROCEDURE — 88304 TISSUE EXAM BY PATHOLOGIST: CPT

## 2021-11-02 PROCEDURE — 88304 TISSUE EXAM BY PATHOLOGIST: CPT | Mod: 26

## 2021-11-02 PROCEDURE — 29881 ARTHRS KNE SRG MNISECTMY M/L: CPT | Mod: RT

## 2021-11-02 PROCEDURE — 29881 ARTHRS KNE SRG MNISECTMY M/L: CPT | Mod: AS,RT

## 2021-11-02 RX ORDER — LEVOTHYROXINE SODIUM 125 MCG
1 TABLET ORAL
Qty: 0 | Refills: 0 | DISCHARGE

## 2021-11-02 RX ORDER — ONDANSETRON 8 MG/1
4 TABLET, FILM COATED ORAL EVERY 6 HOURS
Refills: 0 | Status: DISCONTINUED | OUTPATIENT
Start: 2021-11-02 | End: 2021-11-02

## 2021-11-02 RX ORDER — SODIUM CHLORIDE 9 MG/ML
1000 INJECTION, SOLUTION INTRAVENOUS
Refills: 0 | Status: DISCONTINUED | OUTPATIENT
Start: 2021-11-02 | End: 2021-11-02

## 2021-11-02 RX ORDER — HYDROMORPHONE HYDROCHLORIDE 2 MG/ML
0.5 INJECTION INTRAMUSCULAR; INTRAVENOUS; SUBCUTANEOUS
Refills: 0 | Status: DISCONTINUED | OUTPATIENT
Start: 2021-11-02 | End: 2021-11-02

## 2021-11-02 RX ORDER — OXYCODONE HYDROCHLORIDE 5 MG/1
5 TABLET ORAL EVERY 6 HOURS
Refills: 0 | Status: DISCONTINUED | OUTPATIENT
Start: 2021-11-02 | End: 2021-11-02

## 2021-11-02 RX ORDER — MULTIVIT-MIN/FERROUS GLUCONATE 9 MG/15 ML
1 LIQUID (ML) ORAL
Qty: 0 | Refills: 0 | DISCHARGE

## 2021-11-02 RX ORDER — LISINOPRIL 2.5 MG/1
1 TABLET ORAL
Qty: 0 | Refills: 0 | DISCHARGE

## 2021-11-02 RX ORDER — PROCHLORPERAZINE MALEATE 5 MG
10 TABLET ORAL ONCE
Refills: 0 | Status: DISCONTINUED | OUTPATIENT
Start: 2021-11-02 | End: 2021-11-02

## 2021-11-02 RX ORDER — OXYCODONE HYDROCHLORIDE 5 MG/1
5 TABLET ORAL ONCE
Refills: 0 | Status: DISCONTINUED | OUTPATIENT
Start: 2021-11-02 | End: 2021-11-02

## 2021-11-02 RX ORDER — ONDANSETRON 8 MG/1
4 TABLET, FILM COATED ORAL ONCE
Refills: 0 | Status: DISCONTINUED | OUTPATIENT
Start: 2021-11-02 | End: 2021-11-02

## 2021-11-02 RX ORDER — ASPIRIN/CALCIUM CARB/MAGNESIUM 324 MG
1 TABLET ORAL
Qty: 0 | Refills: 0 | DISCHARGE

## 2021-11-02 RX ORDER — CHOLECALCIFEROL (VITAMIN D3) 125 MCG
1 CAPSULE ORAL
Qty: 0 | Refills: 0 | DISCHARGE

## 2021-11-02 RX ORDER — FAMOTIDINE 10 MG/ML
0 INJECTION INTRAVENOUS
Qty: 0 | Refills: 0 | DISCHARGE

## 2021-11-02 RX ORDER — LANOLIN ALCOHOL/MO/W.PET/CERES
1 CREAM (GRAM) TOPICAL
Qty: 0 | Refills: 0 | DISCHARGE

## 2021-11-02 RX ORDER — OXYCODONE HYDROCHLORIDE 5 MG/1
10 TABLET ORAL ONCE
Refills: 0 | Status: DISCONTINUED | OUTPATIENT
Start: 2021-11-02 | End: 2021-11-02

## 2021-11-02 RX ORDER — FENTANYL CITRATE 50 UG/ML
25 INJECTION INTRAVENOUS
Refills: 0 | Status: DISCONTINUED | OUTPATIENT
Start: 2021-11-02 | End: 2021-11-02

## 2021-11-02 RX ORDER — OMEPRAZOLE 10 MG/1
1 CAPSULE, DELAYED RELEASE ORAL
Qty: 0 | Refills: 0 | DISCHARGE

## 2021-11-02 RX ORDER — SIMVASTATIN 20 MG/1
1 TABLET, FILM COATED ORAL
Qty: 0 | Refills: 0 | DISCHARGE

## 2021-11-02 RX ORDER — ACETAMINOPHEN 500 MG
1000 TABLET ORAL ONCE
Refills: 0 | Status: COMPLETED | OUTPATIENT
Start: 2021-11-02 | End: 2021-11-02

## 2021-11-02 RX ADMIN — Medication 1000 MILLIGRAM(S): at 16:45

## 2021-11-02 RX ADMIN — SODIUM CHLORIDE 125 MILLILITER(S): 9 INJECTION, SOLUTION INTRAVENOUS at 16:28

## 2021-11-02 RX ADMIN — Medication 400 MILLIGRAM(S): at 16:34

## 2021-11-02 NOTE — ASU PATIENT PROFILE, ADULT - NSICDXPASTSURGICALHX_GEN_ALL_CORE_FT
PAST SURGICAL HISTORY:  History of esophagogastroduodenoscopy (EGD)     S/P cholecystectomy & appendectomy 1973    S/P colonoscopy     S/P D&C (status post dilation and curettage)     S/P tonsillectomy

## 2021-11-02 NOTE — ASU DISCHARGE PLAN (ADULT/PEDIATRIC) - CARE PROVIDER_API CALL
Thom France)  Orthopaedic Surgery  [address] [place] [address]  Phone: [phone]  Fax: [phone]  Follow Up Time:

## 2021-11-02 NOTE — BRIEF OPERATIVE NOTE - NSICDXBRIEFPROCEDURE_GEN_ALL_CORE_FT
PROCEDURES:  Arthroscopic debridement of meniscus of right knee 02-Nov-2021 16:14:22 Medial and latera partial menisectomies, synovectomy Clovis Ibarra

## 2021-11-02 NOTE — ASU PATIENT PROFILE, ADULT - NSICDXPASTMEDICALHX_GEN_ALL_CORE_FT
PAST MEDICAL HISTORY:  Depressive disorder     Diverticular disease     Essential hypertension     Gastroesophageal reflux disease without esophagitis     History of kidney stones     History of pneumonia     Hypothyroid     MS (multiple sclerosis)     Pure hypercholesterolemia

## 2021-11-04 ENCOUNTER — APPOINTMENT (OUTPATIENT)
Dept: ORTHOPEDIC SURGERY | Facility: CLINIC | Age: 71
End: 2021-11-04
Payer: MEDICARE

## 2021-11-04 VITALS
HEART RATE: 85 BPM | BODY MASS INDEX: 24.71 KG/M2 | WEIGHT: 163 LBS | HEIGHT: 68 IN | SYSTOLIC BLOOD PRESSURE: 163 MMHG | DIASTOLIC BLOOD PRESSURE: 83 MMHG

## 2021-11-04 PROCEDURE — 99024 POSTOP FOLLOW-UP VISIT: CPT

## 2021-11-08 NOTE — HISTORY OF PRESENT ILLNESS
[de-identified] : Postop Right Knee [de-identified] : The patient comes in today for her right knee.  She is status post arthroscopic meniscectomy yesterday.  She states the postoperative injection anesthetic wore off and she is having some increasing complaints of pain.  She states she was afraid she had did something wrong.\par  [de-identified] : Right Knee: Range of Motion in Degrees\par 	\par 	                  Claimant:    Normal:	\par Flexion Active	    90	    135-degrees	\par Flexion Passive	    90	    135-degrees	\par Extension Active	    0	    0-5-degrees	\par Extension Passive	    0	    0-5-degrees	\par \par There is some pain at extremes of motion.  The wounds are healing well.  No sign of infection.  There is early moderate effusion.  \par  [de-identified] : Status post right knee arthroscopic meniscectomy [de-identified] : At this time, since the patient is doing well status post right knee arthroscopic meniscectomy, she was instructed on home therapeutic modalities and will follow up to have sutures out in 2 weeks.\par

## 2021-11-08 NOTE — ADDENDUM
[FreeTextEntry1] : This note was written by Modesto Ndiaye on 11/08/2021, acting as a scribe for Thom France III, MD

## 2021-11-09 DIAGNOSIS — M17.11 UNILATERAL PRIMARY OSTEOARTHRITIS, RIGHT KNEE: ICD-10-CM

## 2021-11-09 DIAGNOSIS — Z88.0 ALLERGY STATUS TO PENICILLIN: ICD-10-CM

## 2021-11-09 DIAGNOSIS — K21.9 GASTRO-ESOPHAGEAL REFLUX DISEASE WITHOUT ESOPHAGITIS: ICD-10-CM

## 2021-11-09 DIAGNOSIS — M23.221 DERANGEMENT OF POSTERIOR HORN OF MEDIAL MENISCUS DUE TO OLD TEAR OR INJURY, RIGHT KNEE: ICD-10-CM

## 2021-11-09 DIAGNOSIS — M65.9 SYNOVITIS AND TENOSYNOVITIS, UNSPECIFIED: ICD-10-CM

## 2021-11-09 DIAGNOSIS — E03.9 HYPOTHYROIDISM, UNSPECIFIED: ICD-10-CM

## 2021-11-09 DIAGNOSIS — M23.200 DERANGEMENT OF UNSPECIFIED LATERAL MENISCUS DUE TO OLD TEAR OR INJURY, RIGHT KNEE: ICD-10-CM

## 2021-11-09 DIAGNOSIS — E78.00 PURE HYPERCHOLESTEROLEMIA, UNSPECIFIED: ICD-10-CM

## 2021-11-09 DIAGNOSIS — I10 ESSENTIAL (PRIMARY) HYPERTENSION: ICD-10-CM

## 2021-11-09 DIAGNOSIS — Z90.49 ACQUIRED ABSENCE OF OTHER SPECIFIED PARTS OF DIGESTIVE TRACT: ICD-10-CM

## 2021-11-09 DIAGNOSIS — M23.203 DERANGEMENT OF UNSPECIFIED MEDIAL MENISCUS DUE TO OLD TEAR OR INJURY, RIGHT KNEE: ICD-10-CM

## 2021-11-15 ENCOUNTER — APPOINTMENT (OUTPATIENT)
Dept: ORTHOPEDIC SURGERY | Facility: CLINIC | Age: 71
End: 2021-11-15
Payer: MEDICARE

## 2021-11-15 VITALS
SYSTOLIC BLOOD PRESSURE: 146 MMHG | DIASTOLIC BLOOD PRESSURE: 76 MMHG | BODY MASS INDEX: 24.71 KG/M2 | HEART RATE: 87 BPM | HEIGHT: 68 IN | WEIGHT: 163 LBS

## 2021-11-15 DIAGNOSIS — Z98.890 OTHER SPECIFIED POSTPROCEDURAL STATES: ICD-10-CM

## 2021-11-15 PROCEDURE — 99024 POSTOP FOLLOW-UP VISIT: CPT

## 2021-11-16 PROBLEM — Z98.890 S/P ARTHROSCOPY OF RIGHT KNEE: Status: ACTIVE | Noted: 2021-11-01

## 2021-11-16 NOTE — HISTORY OF PRESENT ILLNESS
[de-identified] : Status post right knee arthroscopy [de-identified] : Kendy comes in today.  She states she feels much improved. [de-identified] : Right knee:\par The wound is healing well.  No sign of infection.  The sutures are removed.  [de-identified] : Status post right knee arthroscopy. [de-identified] : The patient is doing well.  At this time start on therapy and be reassessed in four to six weeks.

## 2021-11-16 NOTE — ADDENDUM
[FreeTextEntry1] : This note was written by Soniya Millard on 11/16/2021 acting as scribe for Thom France III, MD

## 2021-12-30 ENCOUNTER — TRANSCRIPTION ENCOUNTER (OUTPATIENT)
Age: 71
End: 2021-12-30

## 2022-09-22 ENCOUNTER — APPOINTMENT (OUTPATIENT)
Dept: FAMILY MEDICINE | Facility: CLINIC | Age: 72
End: 2022-09-22

## 2022-09-22 VITALS
OXYGEN SATURATION: 98 % | HEART RATE: 80 BPM | HEIGHT: 68 IN | SYSTOLIC BLOOD PRESSURE: 138 MMHG | DIASTOLIC BLOOD PRESSURE: 70 MMHG | TEMPERATURE: 97.6 F | WEIGHT: 167 LBS | BODY MASS INDEX: 25.31 KG/M2 | RESPIRATION RATE: 16 BRPM

## 2022-09-22 DIAGNOSIS — E03.9 HYPOTHYROIDISM, UNSPECIFIED: ICD-10-CM

## 2022-09-22 DIAGNOSIS — Z13.31 ENCOUNTER FOR SCREENING FOR DEPRESSION: ICD-10-CM

## 2022-09-22 DIAGNOSIS — I10 ESSENTIAL (PRIMARY) HYPERTENSION: ICD-10-CM

## 2022-09-22 DIAGNOSIS — Z00.00 ENCOUNTER FOR GENERAL ADULT MEDICAL EXAMINATION W/OUT ABNORMAL FINDINGS: ICD-10-CM

## 2022-09-22 DIAGNOSIS — E78.5 HYPERLIPIDEMIA, UNSPECIFIED: ICD-10-CM

## 2022-09-22 PROCEDURE — G0402 INITIAL PREVENTIVE EXAM: CPT

## 2022-09-22 PROCEDURE — G0438: CPT

## 2022-09-22 PROCEDURE — 36415 COLL VENOUS BLD VENIPUNCTURE: CPT

## 2022-09-22 RX ORDER — OXYCODONE AND ACETAMINOPHEN 5; 325 MG/1; MG/1
5-325 TABLET ORAL
Qty: 28 | Refills: 0 | Status: COMPLETED | COMMUNITY
Start: 2021-11-01 | End: 2022-09-22

## 2022-09-22 RX ORDER — LEVOTHYROXINE SODIUM 0.05 MG/1
50 TABLET ORAL
Qty: 90 | Refills: 1 | Status: COMPLETED | COMMUNITY
End: 2022-09-22

## 2022-09-22 NOTE — HEALTH RISK ASSESSMENT
[Never] : Never [No] : In the past 12 months have you used drugs other than those required for medical reasons? No [No falls in past year] : Patient reported no falls in the past year [0] : 2) Feeling down, depressed, or hopeless: Not at all (0) [PHQ-2 Negative - No further assessment needed] : PHQ-2 Negative - No further assessment needed [Patient reported mammogram was normal] : Patient reported mammogram was normal [Patient reported bone density results were normal] : Patient reported bone density results were normal [Patient reported colonoscopy was normal] : Patient reported colonoscopy was normal [Behavioral] : behavioral [Alone] : lives alone [Employed] : employed [Fully functional (bathing, dressing, toileting, transferring, walking, feeding)] : Fully functional (bathing, dressing, toileting, transferring, walking, feeding) [Fully functional (using the telephone, shopping, preparing meals, housekeeping, doing laundry, using] : Fully functional and needs no help or supervision to perform IADLs (using the telephone, shopping, preparing meals, housekeeping, doing laundry, using transportation, managing medications and managing finances) [Smoke Detector] : smoke detector [Carbon Monoxide Detector] : carbon monoxide detector [Seat Belt] :  uses seat belt [Sunscreen] : uses sunscreen [With Patient/Caregiver] : , with patient/caregiver [Name: ___] : Health Care Proxy's Name: [unfilled]  [Relationship: ___] : Relationship: [unfilled] [Audit-CScore] : 0 [[id number]] : 0 [Change in mental status noted] : No change in mental status noted [Reports changes in hearing] : Reports no changes in hearing [Reports changes in vision] : Reports no changes in vision [Reports changes in dental health] : Reports no changes in dental health [MammogramDate] : 1/2022 [BoneDensityDate] : 11/2019 [ColonoscopyDate] : 1/2018 [AdvancecareDate] : 9/2022

## 2022-09-22 NOTE — ASSESSMENT
[FreeTextEntry1] : Annual\par SBIRT negative\par Depression screen negative\par Check comprehensive labs, in office today\par \par Vaccines \par Adacel 3/19\par Pneumovax 2020\par will get Prevnar next visit\par Shingrix 2021 at pharmacy\par Flu will get it in october\par COVID booster 11/21. had COVID 8/22\par \par EKG from cardiology\par \par GYN\par declines\par Mammogram 1/2022\par Colonoscopy 1/2018\par DEXA 2019 \par \par Hypertension\par  BP well controlled \par On Lisinopril and dyazide\par \par Hyperlipidemia\par On statins doing well\par labs reviewed\par \par \par Hypothyroid\par doing well on levothyroxine \par \par \par GERD\par On famotidine \par  if not improving omeprazole prn\par \par Forms filled out for volunteering in [facility].\par MMR titers in chart from 2019 printed and given to patient.\par QuantiFERON TB test done today\par Follow-up in 6 months.

## 2022-09-22 NOTE — PHYSICAL EXAM
[Well Nourished] : well nourished [Well Developed] : well developed [Normal Sclera/Conjunctiva] : normal sclera/conjunctiva [PERRL] : pupils equal round and reactive to light [Normal Outer Ear/Nose] : the outer ears and nose were normal in appearance [Normal Oropharynx] : the oropharynx was normal [Normal TMs] : both tympanic membranes were normal [No Lymphadenopathy] : no lymphadenopathy [Supple] : supple [No Respiratory Distress] : no respiratory distress  [No Accessory Muscle Use] : no accessory muscle use [Clear to Auscultation] : lungs were clear to auscultation bilaterally [Normal Rate] : normal rate  [Regular Rhythm] : with a regular rhythm [Normal S1, S2] : normal S1 and S2 [Soft] : abdomen soft [Non Tender] : non-tender [Non-distended] : non-distended [Normal Bowel Sounds] : normal bowel sounds [Normal Anterior Cervical Nodes] : no anterior cervical lymphadenopathy [No Spinal Tenderness] : no spinal tenderness [Grossly Normal Strength/Tone] : grossly normal strength/tone [No Rash] : no rash [No Focal Deficits] : no focal deficits [Normal Gait] : normal gait [Normal Affect] : the affect was normal [Normal Insight/Judgement] : insight and judgment were intact

## 2022-09-22 NOTE — HISTORY OF PRESENT ILLNESS
[FreeTextEntry1] : Annual [de-identified] : Ms. NATHAN LE is a 71 year old female presenting for annual/ establish care.\par \par Needs form for volunteering filled\par Screening for TB\par Blood pressure has been stable.  She is taking lisinopril and triamterene/hctz.  No complaints\par Her cardiologist On Atorvastatin, labs done recently.\par She states she is on a Keto diet. She follow a low carb diet.  \par Hypothyroid on Levothyroxine\par GERD taking Rx for famotidine\par Has symptoms sometimes.\par \par

## 2022-09-26 LAB
M TB IFN-G BLD-IMP: NEGATIVE
QUANTIFERON TB PLUS MITOGEN MINUS NIL: 6.57 IU/ML
QUANTIFERON TB PLUS NIL: 0.03 IU/ML
QUANTIFERON TB PLUS TB1 MINUS NIL: 0 IU/ML
QUANTIFERON TB PLUS TB2 MINUS NIL: 0 IU/ML

## 2023-03-12 ENCOUNTER — FORM ENCOUNTER (OUTPATIENT)
Age: 73
End: 2023-03-12

## 2023-03-21 ENCOUNTER — APPOINTMENT (OUTPATIENT)
Dept: FAMILY MEDICINE | Facility: CLINIC | Age: 73
End: 2023-03-21

## 2024-02-08 NOTE — ASU PATIENT PROFILE, ADULT - AS SC BRADEN MOISTURE
Dr Birch,     Dr Shafer would like to schedule a rectal EUS for this patient in about a month, please advice if there is a certain Monday that would work best.    Thank you      Instructions    Instructions for the rectal EUS  1. Schedule rectal endoscopic ultrasound with MAC with Dr. Birch in ~1 month [Diagnosis: rectosigmoid neuroendocrine tumor]     2.  bowel prep from pharmacy (split dose golytely)     3. Continue all medications for procedure     4. Read all bowel prep instructions carefully            (4) rarely moist

## 2024-04-23 ENCOUNTER — APPOINTMENT (OUTPATIENT)
Dept: GASTROENTEROLOGY | Facility: CLINIC | Age: 74
End: 2024-04-23
Payer: MEDICARE

## 2024-04-23 VITALS
HEIGHT: 66 IN | OXYGEN SATURATION: 98 % | DIASTOLIC BLOOD PRESSURE: 70 MMHG | BODY MASS INDEX: 27.48 KG/M2 | WEIGHT: 171 LBS | SYSTOLIC BLOOD PRESSURE: 126 MMHG | HEART RATE: 72 BPM

## 2024-04-23 DIAGNOSIS — K76.89 OTHER SPECIFIED DISEASES OF LIVER: ICD-10-CM

## 2024-04-23 DIAGNOSIS — Z78.9 OTHER SPECIFIED HEALTH STATUS: ICD-10-CM

## 2024-04-23 DIAGNOSIS — K86.2 CYST OF PANCREAS: ICD-10-CM

## 2024-04-23 DIAGNOSIS — K57.90 DIVERTICULOSIS OF INTESTINE, PART UNSPECIFIED, W/OUT PERFORATION OR ABSCESS W/OUT BLEEDING: ICD-10-CM

## 2024-04-23 DIAGNOSIS — K82.8 OTHER SPECIFIED DISEASES OF GALLBLADDER: ICD-10-CM

## 2024-04-23 DIAGNOSIS — K21.9 GASTRO-ESOPHAGEAL REFLUX DISEASE W/OUT ESOPHAGITIS: ICD-10-CM

## 2024-04-23 DIAGNOSIS — Q45.3 OTHER CONGENITAL MALFORMATIONS OF PANCREAS AND PANCREATIC DUCT: ICD-10-CM

## 2024-04-23 PROCEDURE — 99204 OFFICE O/P NEW MOD 45 MIN: CPT

## 2024-04-23 RX ORDER — SIMVASTATIN 20 MG/1
20 TABLET, FILM COATED ORAL
Refills: 0 | Status: ACTIVE | COMMUNITY

## 2024-04-23 RX ORDER — LEVOTHYROXINE SODIUM 0.07 MG/1
75 TABLET ORAL
Refills: 0 | Status: ACTIVE | COMMUNITY

## 2024-04-23 NOTE — ASSESSMENT
[FreeTextEntry1] : Impression: Patient is of average risk colorectal cancer next colonoscopy should be at a 10-year interval i.e. 2028.  Patient will be turning 74 this year.  May consider deferring further colorectal cancer screening, but will put in for recall and discuss when the time comes.  No need to follow pancreatic cysts with MRI as they are not suspicious, not consistent with sidebranch IPMN's probably secondary to previous inflammation in the settings of pancreas disease him.  Have been stable for many years.  Plan: Will most likely defer further colorectal cancer screening but will put a recall in for 10 years and confirm at that time.

## 2024-04-23 NOTE — HISTORY OF PRESENT ILLNESS
[FreeTextEntry1] : 73-year-old female with a history of diverticulosis, IBS was given a 5-year recall at the time of her last colonoscopy in 2018.  Indication for procedure is listed as history of polyps; however, patient denies history of polyps.  Review of prior colonoscopies confirm this.  Patient is of average risk for colorectal cancer.  Mild alternating diarrhea and constipation takes a probiotic and a fiber supplement which helps.  History of benign liver cyst which was aspirated in the past, history of cyst involving gallbladder fossa many years postcholecystectomy that was aspirated and collapsed.  No significant pathology.  Several small pancreatic cyst in the setting of pancreas disease him most likely secondary to prior subclinical pancreatitis.  Was followed by MRI with last exam 4 years ago, no suspicious findings.

## 2025-06-02 NOTE — REASON FOR VISIT
Writer spoke with pts Mother who stated pt will be on respite care until end of August and therefore scheduled below FU on 8/28/25    If earlier appt necessary Mother stated she could schedule an appt without patient present.   [Initial Visit] : an initial visit for [FreeTextEntry2] : her right knee